# Patient Record
Sex: FEMALE | Race: WHITE
[De-identification: names, ages, dates, MRNs, and addresses within clinical notes are randomized per-mention and may not be internally consistent; named-entity substitution may affect disease eponyms.]

---

## 2017-06-10 NOTE — RAD
HISTORY: Pain after fall



COMPARISONS: None



VIEWS: 8, Frontal, lateral, open-mouth odontoid, and bilateral oblique views of the

cervical spine.



FINDINGS: 

The cervical spine is visualized from the skull base through T1.



ALIGNMENT:  There is straightening of the normal cervical lordosis.

VERTEBRAL BODIES:  There is diffuse osteopenia. Is multilevel anterolateral marginal

osteophyte formation most pronounced from C3-C4 inferiorly through C6-C7. There is

suboptimal visualization of the odontoid process, though this appears intact. There is no

appreciable displaced fracture.

JOINTS:  There is diffuse, extensive uncovertebral and facet osteoarthritic change. There

is moderate diffuse neural foraminal narrowing on the oblique views bilaterally.    

INTERVERTEBRAL DISCS:  There is diffuse loss of intervertebral disc height.

SOFT TISSUE: The prevertebral soft tissues are normal.



OTHER:  The skull base is normal. The lung apices are clear.



IMPRESSION: 

OSTEOPENIA.

EXTENSIVE DISC DISEASE AND OSTEOARTHRITIS.

NO DEFINITE ACUTE OSSEOUS INJURY. IF SYMPTOMS PERSIST, CONSIDER REPEAT IMAGING.

## 2017-06-10 NOTE — RAD
HISTORY: Pain after fall



COMPARISONS: None



VIEWS: 5, frontal, axial, and bilateral oblique views of the mandible    



FINDINGS:



BONE DENSITY: Normal.

BONES: There is no displaced fracture.    

JOINTS: There is no arthropathy.    

ALIGNMENT: There is no dislocation. 

SOFT TISSUES: Unremarkable.



OTHER FINDINGS: None.



IMPRESSION: 

NO ACUTE OSSEOUS INJURY. IF SYMPTOMS PERSIST, RECOMMEND REPEAT IMAGING.

## 2017-06-10 NOTE — UC
Minor Trauma HPI





- HPI Summary


HPI Summary: 


2 days ago fell backward off a 6ft ledge in her yard-she was able to get her 

self up and continue to work---yesterday she was a bit sore and today the 

soreness in her back neck and jaw is worse








- History of Current Complaint


Chief Complaint: UCTrauma


Stated Complaint: JAW INJURY


Time Seen by Provider: 06/10/17 09:31


Hx Obtained From: Patient


Pregnant?: No


Onset/Duration: Sudden Onset, Lasting Days - 2, Worse Since - this morning


Onset Of Pain: Post Accident - and worse 2 days later


Severity Initially: Mild


Severity Currently: Moderate


Pain Intensity: 6


Pain Scale Used: 0-10 Numeric


Mechanism Of Injury: Fall From Height Of: - 6


Aggravating Factor(s): Nothing


Alleviating Factor(s): Nothing





- Allergies/Home Medications


Allergies/Adverse Reactions: 


 Allergies











Allergy/AdvReac Type Severity Reaction Status Date / Time


 


No Known Allergies Allergy   Verified 06/10/17 09:28














PMH/Surg Hx/FS Hx/Imm Hx


Previously Healthy: Yes





- Surgical History


Surgical History: Yes


Surgery Procedure, Year, and Place: LT ANKLE ACHILLES TENDON REPLACED 2003, RT 

KNEE ARTHROSCOPIC, RT WRIST, RT ELBOW ,TONSILS AGE 8, WISDOM TEETH REMOVED,1997 

LEFT BREAST BIOPSY





- Family History


Known Family History: Positive: None





- Social History


Occupation: Retired


Lives: With Family


Alcohol Use: Rare


Substance Use Type: None


Smoking Status (MU): Never Smoked Tobacco





Review of Systems


Constitutional: Negative


Skin: Negative


Eyes: Negative


ENT: Negative


Respiratory: Negative


Cardiovascular: Negative


Gastrointestinal: Negative


Genitourinary: Negative


Motor: Negative


Neurovascular: Negative


Musculoskeletal: Negative, Myalgia - neck and and upper back


Neurological: Negative


Psychological: Negative


All Other Systems Reviewed And Are Negative: Yes





Physical Exam


Triage Information Reviewed: Yes


Appearance: Well-Appearing, No Pain Distress, Well-Nourished


Vital Signs: 


 Initial Vital Signs











Temp  100.0 F   06/10/17 09:24


 


Pulse  75   06/10/17 09:24


 


Resp  18   06/10/17 09:24


 


BP  152/75   06/10/17 09:24


 


Pulse Ox  98   06/10/17 09:24











Vital Signs Reviewed: Yes


Eye Exam: Normal


Eyes: Positive: Conjunctiva Clear


ENT Exam: Normal


ENT: Positive: Normal ENT inspection, Hearing grossly normal, TMs normal.  

Negative: Nasal congestion, Nasal drainage, Tonsillar swelling, Tonsillar 

exudate, Trismus, Muffled/hoarse voice


Dental Exam: Normal


Neck exam: Normal


Neck: Positive: Supple, Nontender, No Lymphadenopathy


Respiratory Exam: Normal


Respiratory: Positive: Chest non-tender, Lungs clear, Normal breath sounds, No 

respiratory distress, No accessory muscle use


Cardiovascular Exam: Normal


Cardiovascular: Positive: RRR, No Murmur, Pulses Normal, Brisk Capillary Refill


Musculoskeletal Exam: Normal


Musculoskeletal: Positive: Strength Intact, ROM Intact, No Edema


Neurological Exam: Normal


Neurological: Positive: Alert, Muscle Tone Normal


Psychological Exam: Normal


Skin Exam: Normal





Diagnostics





- Laboratory


Diagnostic Studies Completed/Ordered: osteopenia, no fx,





Minor Trauma Course/Dx





- Course


Course Of Treatment: ibuprofen, soma, ice, mayank exercise, follow with pcp





- Differential Dx/Diagnosis


Differential Diagnosis/HQI/PQRI: Contusion(s), Dislocation, Sprain, Strain


Provider Diagnoses: COntusions, muscle strain





Discharge





- Discharge Plan


Condition: Stable


Disposition: HOME


Prescriptions: 


Carisoprodol TAB* [Soma  TAB*] 175 - 350 mg PO TID PRN #10 tab MDD 3


 PRN Reason: pain


Ibuprofen TAB* [Motrin TAB* 600 MG] 600 mg PO Q6H PRN #30 tab


 PRN Reason: pain


Patient Education Materials:  Cervical Strain (ED), Muscle Strain (ED), Core 

Strengthening Exercises (GEN)


Referrals: 


Ernestine Espinoza MD [Primary Care Provider] - If Needed

## 2017-06-10 NOTE — RAD
HISTORY: Pain after fall



COMPARISONS: None



VIEWS: 2, Frontal and lateral views of the thoracic spine.



FINDINGS:



ALIGNMENT:  There is a scoliotic curvature of the spine

VERTEBRAL BODIES:  There is diffuse osteopenia. There is multilevel anterolateral marginal

osteophyte formation. The vertebral bodies are preserved in height.

JOINTS:  There is osteoarthritis of the costovertebral articulations

INTERVERTEBRAL DISCS:  There is diffuse loss of intervertebral disc height.

SOFT TISSUE: Unremarkable



OTHER:  The visualized lungs are clear.



IMPRESSION: 

OSTEOPENIA.

DEGENERATIVE DISC DISEASE AND OSTEOARTHRITIS.

## 2017-12-04 NOTE — HP
HISTORY AND PHYSICAL:

 

DATE OF OFFICE VISIT:  12/04/17

 

DATE OF SURGERY:  12/15/17

 

SURGEON:  Nirmala Baldwin MD * (DICTATED BY LLOYD REHMAN)

 

PROCEDURE:  Left total knee arthroplasty.

 

CHIEF COMPLAINT:  Left knee pain.

 

HISTORY OF PRESENT ILLNESS:  Ms. Grant is a 72-year-old female with complaints 
of left knee pain secondary to end-stage osteoarthritis.  She has failed 
conservative management and elected to proceed with a left total knee 
arthroplasty, which is scheduled for 12/15/17 with Dr. Baldwin.

 

PAST MEDICAL HISTORY:  Hypertension.

 

PAST SURGICAL HISTORY:  Tonsillectomy, left knee arthroscopy, right elbow 
tendon release, left breast biopsy, de Quervain's release, removal of a skin 
keratosis, and Achilles tendon transfer.

 

CURRENT MEDICATIONS:  None.

 

ALLERGIES:  PENICILLIN and BAND-AIDS.

 

FAMILY HISTORY:  Diabetes and heart disease.

 

SOCIAL HISTORY:  She is a 72-year-old female.  She lives with her sister.  She 
does not smoke or use drugs.  Uses occasional alcohol.

 

REVIEW OF SYSTEMS:  A complete 14-point review of systems was reviewed with the 
patient, it was all negative and noncontributory.

 

                               PHYSICAL EXAMINATION

 

GENERAL:  She is well developed, well nourished, in no acute distress.

 

VITAL SIGNS:  She stands 5 feet tall, weighs 153 pounds.  Her blood pressure is 
157/82, her heart rate is 84.

 

HEENT:  Normocephalic, atraumatic.

 

NECK:  Supple.  No palpable lymph nodes.

 

PULMONARY:  The lungs are clear to auscultation bilaterally.

 

CARDIO:  Regular rate and rhythm.  Strong S1, S2.

 

ABDOMEN:  Soft, nontender, and nondistended.

 

MUSCULOSKELETAL:  Left lower extremity, the skin is intact.  There are no open 
wounds or abrasions.  There is a moderate joint effusion and tenderness over 
the medial and lateral joint line.  5 to 125 degrees of range of motion with 
patellofemoral crepitus.  2+ dorsalis pedis pulses.  Her lower extremity muscle 
group strengths were intact at 5/5 and she has intact sensation.

 

NEUROLOGICAL:  She is alert and oriented x3.  Cranial nerves II through XII are 
intact.

 

 ASSESSMENT AND PLAN:  Ms. Grant is a 72-year-old female with continued 
complaints of left knee pain secondary to end-stage osteoarthritis.  She has 
failed conservative management and elected to proceed with a left total knee 
arthroplasty which is scheduled for 12/15/17 with Dr. Baldwin.  Dr. Baldwin 
discussed the risks and benefits of the surgery at today's visit and all of her 
questions were answered. Percocet and Colace were sent to her pharmacy for 
postoperative pain control.  She has elected aspirin twice daily for DVT 
prophylaxis following the surgery and she will see Dr. Baldwin back in 2 weeks 
after the surgery.

 

 ____________________________________ LLOYD REHMAN

 

[id number]/CPS #: [id number]

MTDD

## 2017-12-15 NOTE — RAD
INDICATION: Left knee surgery     



COMPARISON: October 16, 2017



TECHNIQUE: A portable 2 view examination was performed.



FINDINGS: There is left knee arthroplasty. Both femoral and tibial components appear well

seated. There is no overlying cooling jacket. A drain is in place.



IMPRESSION: POSTOPERATIVE LEFT TOTAL KNEE

## 2017-12-16 NOTE — OP
OPERATIVE REPORT:

 

DATE OF OPERATION:  12/15/17

 

DATE OF BIRTH:  [birth year]

 

ATTENDING SURGEON:  Kathi Baldwin MD

 

ASSISTANT:  LLOYD Trujillo

 

Mr. Miramontes did help throughout the procedure with preparation of the leg, wound retraction, manipulat
ion of the knee, and wound closure.

 

ANESTHESIOLOGIST:  Dr. Javier.

 

ANESTHESIA:  Spinal.

 

PRE-OP DIAGNOSIS:  Severe end-stage degenerative osteoarthritis of the left knee joint.

 

POST-OP DIAGNOSIS:  Severe end-stage degenerative osteoarthritis of the left knee joint.

 

OPERATIVE PROCEDURE:  Left total knee arthroplasty.

 

TOURNIQUET TIME:  43 minutes.

 

ESTIMATED BLOOD LOSS:  300 cc.

 

COMPLICATIONS:  None.

 

SPECIMENS:  Bone and cartilage from the left knee joint sent to Pathology.

 

HARDWARE:  All hardware was cemented [employer] total knee hardware.  Two packages of Simplex b
one cement.  For the femur, a size 4 left posterior stabilized Legion femoral component.  For the tib
ia, a size 3 left Marley II tibial base plate.  For the insert, a 9 mm posterior stabilized articula
r insert size 3-4, and for the patellar, a 32-mm 3-peg all poly patella thickness 7.5.

 

BRIEF HISTORY/INDICATIONS:  Ms. Grant is a 72-year-old female with years of increasingly severe left
 knee pain.  Radiographs showed bone on bone arthritis. She failed conservative treatment with antiin
flammatories, pain medications, physical therapy, and intraarticular injections.  Due to continued pa
in and decreased quality of life, she elected to undergo a left total knee arthroplasty. Informed con
sent was obtained from the patient.  She understood the risks of surgery included but were not limite
d to bleeding, infection, damage to nearby structures, continued pain, need for further surgery, intr
aoperative fracture, nerve palsy, hardware failure or loosening, knee stiffness, loss of motion, stro
ke, heart attack, blood clot, and death.  She wished to proceed.

 

INTRAOPERATIVE FINDINGS:  Intraoperatively, the patient is noted to have severe end- stage arthritis 
in a tricompartmental fashion.  There was complete loss of cartilage in all 3 compartments.

 

DESCRIPTION OF PROCEDURE:  Ms. Grant was identified in the preanesthesia unit. Her left lower extrem
ity was marked as the correct operative site.  Informed consent was signed and placed in the chart.  
The patient was taken to the operating room and placed under spinal anesthesia.  A Mariano catheter was
 placed.  Tourniquet was placed on the left thigh.  Left lower extremity was prepped and draped in th
e usual sterile fashion.  Preop time-out was made to correctly identify the patient side and site.  A
ppropriate perioperative antibiotics were given within 1 hour of incision.

 

Tourniquet was inflated and a midline incision of 12 cm was made with a 10-blade. A new 10-blade was 
used to make a standard medial parapatellar arthrotomy.  The patella was subluxed laterally.  Electro
cautery was used to subperiosteally elevate soft tissue off the superomedial tibia to the mid sagitta
l plane.  The knee was flexed up.  The anterior horn of the lateral meniscus and ACL were sharply rel
eased.  A drill was used to enter the distal femur.  Intramedullary distal femoral cutting guide was 
pinned on the distal femur.  Oscillating saw was used to make the appropriate distal femoral cut.  Ex
ternal rotation guide was pinned on the distal femur.  Distal femur was sized to a size 4.  Size 4 mu
lti cutting jig was pinned on the distal femur.  Oscillating saw was used to make the appropriate 4 c
hamfer cuts.

 

The PCL was completely released.  The tibia was subluxed anteriorly. Extramedullary tibial cutting gu
kaden was pinned on the proximal tibia.  Oscillating saw was used to make a proximal tibial cut perpend
icular to the mechanical axis of the tibia.  The bone was carefully removed.  The knee was brought ou
t into full extension.  Medial and lateral ligaments were well balanced.  The spacer block  had good 
fit.  Flexion and extension gaps were well balanced.

 

The knee was flexed up.  Lamina  was placed both medially and laterally. Any remaining menisc
us was carefully removed using electrocautery.  Curved osteotome was used to remove any posterior ost
eophytes.  Tibial tray and drop rods were placed to once again confirm a satisfactory tibial cut.  Th
is was confirmed.

 

A size 4 left femoral trial was impacted on to the distal femur.  Box cut osteotome and reamer were u
sed to carefully remove bone for the posterior stabilized implant. A size 3 tibial tray and a 9-mm in
sert trial were placed.  The knee was taken through a range of motion.  The knee had full extension t
o 130 degrees of flexion with satisfactory patellofemoral tracking.

 

The patella was everted.  7 mm of patellar bone and cartilage was carefully removed from the patella.
  The three peg holes were drilled through the size 32 guide. Trial 32 patellar was placed and the kn
ee was taken through a range of motion. Patellofemoral tracking was satisfactory.

 

All trials were carefully removed.  The tibia was subluxed anteriorly and sized to a size 3.  Proxima
l tibia was prepared using a size 3 keel punch.  All bony cut surfaces were copiously irrigated with 
sterile saline and dried.  Final implants were cemented into place starting with the tibia followed b
y the femur and last the patella.  A 9-mm insert trial was placed and the knee was taken out into ful
l extension.  Cement was allowed to fully cure and tourniquet was turned down at 43 minutes.  The kne
e was copiously irrigated with sterile saline.  Electrocautery was used to obtain meticulous hemostas
is.

 

The cement was allowed to fully cured.  The insert trial was removed.  Any cement around the implants
 or capsule was removed.  A 9-mm posterior stabilized articular insert size 3-4 was locked into posit
ion on the tibial tray.  The stability was checked and rechecked and noted to be stable.

 

The knee was once again copiously irrigated with sterile saline.  The extensor mechanism was closed u
sing interrupted #1 Vicryl over a medium Hemovac drain.  The rest of the incision was closed in a lay
ered fashion using 0 and 2-0 Vicryls.  The skin was closed using running 3-0 nylon suture.  Sterile X
eroform, 4x4s, and Webril were used to cover the incision.  Ace wrap and cold pack were placed over t
his. The patient's anesthesia was reversed without difficulty.  She was taken to the PACU in stable c
ondition.  Intended weight bearing will be weight bearing as tolerated.  Intended DVT prophylaxis kimberley
l be Coumadin with a Lovenox bridge.

 

 [id number]/CPS #: [id number]

## 2017-12-16 NOTE — PN
Progress Note





- Progress Note


Date of Service: 12/16/17


SOAP: 


Subjective:


Pt. is alert, reports severe pain.  








Objective:


LLE - drain removed, tip intact, distally nvi.  





Vital Signs:











Temp Pulse Resp BP Pulse Ox


 


 97.4 F   79   18   132/73   100 


 


 12/16/17 03:51  12/16/17 03:51  12/16/17 08:45  12/16/17 03:51  12/16/17 03:51








 Laboratory Results - last 24 hr











  12/16/17 12/16/17 12/16/17





  05:17 05:17 05:17


 


Hgb  10.6 L  


 


Hct  31 L  


 


Plt Count  268  


 


MPV  8  


 


INR (Anticoag Therapy)   1.22 H 


 


Sodium    130 L


 


Potassium    3.9


 


Chloride    97 L


 


Carbon Dioxide    28


 


Anion Gap    5


 


BUN    11


 


Creatinine    0.71


 


Est GFR ( Amer)    104.7


 


Est GFR (Non-Af Amer)    81.4


 


BUN/Creatinine Ratio    15.5


 


Glucose    111 H


 


Calcium    8.4 L

















Assessment:


73 yo F pod 1 s/p LTKA








Plan:


ER morphine 15 mg bid added for pain control


wbat 


pt/ot


6 mg coumadin tonight, lovenox today


plan d/c to home monday with snf

## 2017-12-17 NOTE — PN
Progress Note





- Progress Note


Date of Service: 12/17/17


SOAP: 


Subjective:


Pt. is alert, pain controlled, wants to go home today.  








Objective:


LLE - dressing changed, inc c/d/i.  distally nvi.  





Vital Signs:











Temp Pulse Resp BP Pulse Ox


 


 97.2 F   60   18   127/67   96 


 


 12/17/17 04:11  12/17/17 04:11  12/17/17 07:07  12/17/17 04:11  12/17/17 07:07








 Laboratory Results - last 24 hr











  12/17/17 12/17/17





  06:05 06:05


 


Hgb  9.9 L 


 


Hct  29 L 


 


Plt Count  220 


 


MPV  8 


 


INR (Anticoag Therapy)   1.32 H

















Assessment:


71 yo F pod 2 s/p LTKA








Plan:


wbat lle 


pt/ot


home on ecasa bid 325


lovenox today before d/c


plan d/c today with vns

## 2018-07-24 ENCOUNTER — HOSPITAL ENCOUNTER (INPATIENT)
Dept: HOSPITAL 25 - AA | Age: 73
LOS: 107 days | Discharge: HOME | DRG: 470 | End: 2018-11-08
Attending: ORTHOPAEDIC SURGERY | Admitting: ORTHOPAEDIC SURGERY
Payer: MEDICARE

## 2018-07-24 DIAGNOSIS — Z88.6: ICD-10-CM

## 2018-07-24 DIAGNOSIS — Z96.652: ICD-10-CM

## 2018-07-24 DIAGNOSIS — M17.11: Primary | ICD-10-CM

## 2018-07-24 DIAGNOSIS — Z80.3: ICD-10-CM

## 2018-07-24 DIAGNOSIS — M41.9: ICD-10-CM

## 2018-07-24 DIAGNOSIS — Z84.1: ICD-10-CM

## 2018-07-24 DIAGNOSIS — Z82.49: ICD-10-CM

## 2018-07-24 DIAGNOSIS — M25.461: ICD-10-CM

## 2018-07-24 DIAGNOSIS — Z82.0: ICD-10-CM

## 2018-07-24 DIAGNOSIS — Z72.89: ICD-10-CM

## 2018-07-24 DIAGNOSIS — M25.761: ICD-10-CM

## 2018-07-24 DIAGNOSIS — L71.9: ICD-10-CM

## 2018-07-24 DIAGNOSIS — Z88.1: ICD-10-CM

## 2018-07-24 DIAGNOSIS — M47.812: ICD-10-CM

## 2018-07-24 DIAGNOSIS — E66.3: ICD-10-CM

## 2018-07-24 DIAGNOSIS — Z81.1: ICD-10-CM

## 2018-07-24 DIAGNOSIS — I10: ICD-10-CM

## 2018-07-24 DIAGNOSIS — Z82.61: ICD-10-CM

## 2018-07-24 DIAGNOSIS — Z86.12: ICD-10-CM

## 2018-07-24 DIAGNOSIS — Z88.0: ICD-10-CM

## 2018-07-24 PROCEDURE — 85018 HEMOGLOBIN: CPT

## 2018-07-24 PROCEDURE — C1776 JOINT DEVICE (IMPLANTABLE): HCPCS

## 2018-07-24 PROCEDURE — 85014 HEMATOCRIT: CPT

## 2018-07-24 PROCEDURE — 80048 BASIC METABOLIC PNL TOTAL CA: CPT

## 2018-07-24 PROCEDURE — 36415 COLL VENOUS BLD VENIPUNCTURE: CPT

## 2018-07-24 PROCEDURE — 85610 PROTHROMBIN TIME: CPT

## 2018-07-24 PROCEDURE — 85049 AUTOMATED PLATELET COUNT: CPT

## 2018-10-24 NOTE — HP
*** AMENDED REPORT NOW INCLUDES DESIGNATED COSIGNER ***



HISTORY AND PHYSICAL:

 

DATE OF ADMISSION/SURGERY:  11/06/18

 

DATE OF OFFICE VISIT:  10/24/18

 

SURGEON:  Kathi Baldwin MD * (DICTATED YBLLOYD BURGOS)

 

PROCEDURE:  Right total knee arthroplasty.

 

CHIEF COMPLAINT:  Right knee pain.

 

HISTORY OF PRESENT ILLNESS:  Ms. Grant is a 73-year-old female with end-stage 
osteoarthritis of the right knee.  She has failed conservative treatment and 
elected to proceed with a right total knee arthroplasty, which is scheduled for 
11/06/18.

 

PAST MEDICAL HISTORY:  Hypertension.

 

PAST SURGICAL HISTORY:  Tonsillectomy, right knee arthroscopy, right elbow 
surgery, breast biopsy, right wrist de Quervain's release, Achilles tendon 
transfer left lower extremity and a left total knee arthroplasty.

 

CURRENT MEDICATIONS:  Losartan potassium 25 mg daily.

 

ALLERGIES:  To CLINDAMYCIN causing a rash, MELOXICAM, and PENICILLIN.

 

FAMILY HISTORY:  Dementia.

 

SOCIAL HISTORY:  She is a 73-year-old female.  She lives with her sister.  She 
does not smoke or use drugs.  Uses occasional alcohol.

 

REVIEW OF SYSTEMS:  A complete 14-point review of systems was reviewed with the 
patient.  It was all negative or noncontributory.

 

                               PHYSICAL EXAMINATION

 

GENERAL:  She is well developed, well nourished, in no acute distress.

 

VITAL SIGNS:  She stands 5 feet tall, weighs 160 pounds.  Her blood pressure is 
140/88 and her heart rate is 96.

 

HEENT:  Normocephalic, atraumatic.

 

NECK:  Supple.  No palpable lymph nodes.

 

PULMONARY:  The lungs are clear to auscultation bilaterally.

 

CARDIO:  Regular rate and rhythm.  Strong S1, S2.

 

ABDOMEN:  Soft, nontender, nondistended.

 

MUSCULOSKELETAL:  Right lower extremity:  The skin is intact.  There are no 
open wounds or abrasions.  She has a moderate joint effusion, some tenderness 
over the medial and lateral joint line.  Range of motion is 5 to 130 degrees.  
She is distally neurovascularly intact.

 

 ASSESSMENT AND PLAN:  Ms. Grant is a 73-year-old female with end-stage 
osteoarthritis of the right knee.  She has failed conservative treatment and 
elected to proceed with a right total knee arthroplasty, which is scheduled for 
11/06/18 with Dr. Baldwin.  Dr. Baldwin discussed the risks and benefits of the 
surgery at today's visit and all of her questions were answered.  She will 
follow up with Dr. Baldwin 2 weeks after the surgery.

 

 ____________________________________ LLOYD REHMAN

 

[id number]/CPS #: [id number]

MTDSALLY

## 2018-11-06 PROCEDURE — 0SRC0J9 REPLACEMENT OF RIGHT KNEE JOINT WITH SYNTHETIC SUBSTITUTE, CEMENTED, OPEN APPROACH: ICD-10-PCS | Performed by: ORTHOPAEDIC SURGERY

## 2018-11-06 RX ADMIN — MAGNESIUM HYDROXIDE SCH ML: 400 SUSPENSION ORAL at 21:06

## 2018-11-06 RX ADMIN — OXYCODONE HYDROCHLORIDE PRN MG: 5 CAPSULE ORAL at 21:18

## 2018-11-06 RX ADMIN — MORPHINE SULFATE PRN MG: 4 INJECTION INTRAVENOUS at 22:34

## 2018-11-06 RX ADMIN — MAGNESIUM OXIDE TAB 400 MG (241.3 MG ELEMENTAL MG) SCH MG: 400 (241.3 MG) TAB at 21:05

## 2018-11-06 RX ADMIN — ACETAMINOPHEN SCH: 325 TABLET ORAL at 22:44

## 2018-11-06 RX ADMIN — DOCUSATE SODIUM SCH MG: 100 CAPSULE, LIQUID FILLED ORAL at 21:05

## 2018-11-06 NOTE — CONS
CC:  Dr. Ernestine Espinoza; Dr. Kathi Baldwin *

 

[facility] MEDICINE CONSULTATION REPORT:

 

DATE OF CONSULT:  18

 

PRIMARY CARE PROVIDER:  Dr. Ernestine Espinoza.

 

REQUESTING PHYSICIAN IN CONSULT:  Dr. Kathi Baldwin.

 

ATTENDING PHYSICIAN:  Dr. Virginia More.

 

REASON FOR CONSULT:  Hypertension.

 

HISTORY OF PRESENT ILLNESS:  Ms. Grant is a 73-year-old female with history of 
hypertension and right knee osteoarthritis, who presented to [facility] on 18 
for an elective total right knee arthroplasty.  I will refer you to LLOYD Rosenberg's history and physical dictated on 10/24/18 for complete details.  
Ms. Grant reports that she has typically been active, though recently has had 
difficulty with activity because of her right knee pain.  She reports that the 
pain is essentially nonexistent when she is not bending her knee, but she has 
significant pain with bending; hence, she has not been able to perform 
difficult activities like walking long distances, which she once was though she 
does try to stay active.  Ms. Grant reports that she only developed 
hypertension approximately 1 year ago after her last surgery, she reports that 
her blood pressure started to increase slowly and she was subsequently placed 
on losartan by her primary care provider.  She has done well on the losartan, 
has had no side effects, and appears to be well managed at baseline according 
to the patient and office notes from her PCP.

 

PAST MEDICAL HISTORY:  

1.  Hypertension.

2.  Osteoarthritis.

 

PAST SURGICAL HISTORY:

1.  Tonsillectomy.

2.  Right knee arthroscopy.

3.  Right elbow surgery.

4.  Right wrist de Quervain's release.

5.  Achilles tendon transfer on the left lower extremity.

6.  Left total knee arthroplasty.

 

HOME MEDICATIONS:

1.  Aspirin 325 mg p.o. b.i.d. p.r.n.

2.  AlgaeCal supplement 2 caps p.o. daily.

3.  Calcium, magnesium, zinc 1 tab p.o. daily.

4.  Vitamin D3 2000 units p.o. daily.

5.  Kelp 100 mg p.o. daily.

6.  Losartan 25 mg p.o. daily.

7.  Magnesium oxide 800 mg p.o. b.i.d.

8.  Vitamin K2 100 mcg p.o. daily.

9.  Milk Thistle 300 mg p.o. daily.

10.  Women's Multivitamin 1 tab p.o. daily.

11.  Psyllium 0.4 g p.o. daily p.r.n.

12.  Selenium 400 mcg p.o. daily.

13.  CoQ10 30 mg p.o. daily.

14.  Zinc 15 mg p.o. daily.

 

ALLERGIES:  PENICILLIN, CLINDAMYCIN, and MELOXICAM.

 

FAMILY HISTORY:  She reports her father  at the age of 97, he had CHF and 
kidney disease.  Her mother passed away in her 70s, she had CHF and alcohol 
abuse. No family history of cancer, diabetes, or coronary artery disease.

 

SOCIAL HISTORY:  She denies any tobacco or recreational drug use.  She reports 
approximately 1 alcoholic drink a day, usually wine with dinner.  She lives 
with her sister, who would be her surrogate decision maker.

 

REVIEW OF SYSTEMS:  An 11-point review of systems was performed and all 
pertinent positive and negative findings are in the HPI, all other systems are 
negative.

 

PHYSICAL EXAM:  General:  Ms. Grant is a well-developed, well-nourished 
overweight white woman, lying in bed, in no acute distress.  She appears her 
stated age. Vital Signs:  Temp 97.9, heart rate 71, respiratory rate 16, oxygen 
saturation 97% on 2 L, blood pressure 128/69.  HEENT:  Head is atraumatic, 
normocephalic.  Visual fields are grossly intact.  Pupils equal, round, and 
reactive to light and accommodation.  Mucous membranes are moist.  Neck:  
Thyroid not palpable.  Trachea midline.  No lymphadenopathy.  Respiratory:  
Symmetrical chest expansion.  No chest wall deformities.  Lungs:  Clear to 
auscultation throughout.  No rhonchi, wheezes, or rales.  Cardiovascular:  
Regular rate and rhythm.  S1, S2 present.  No murmurs, rubs, or gallops.  No 
JVD.  Extremities:  Skin warm and smooth bilaterally.  No edema.  Pedal pulses 2
+ bilaterally.  Abdomen:  Soft, nontender to palpation. Bowel sounds 
normoactive throughout.  Neuro:  Awake, alert, and oriented x4. Cranial nerves 
II through XII grossly intact.

 

DIAGNOSTIC STUDIES/LAB DATA:  She had most recent labs on 10/24/18.  WBC 6.6, 
RBC 4.65, hemoglobin 14.8, hematocrit 43, platelets 254.  INR 0.92.  Sodium 141
, potassium 4.2, chloride 102, carbon dioxide 31, BUN 21, creatinine 0.85, 
glucose 95.  TSH 2.72.  Urinalysis unremarkable.

 

She had a chest x-ray on 10/24/18, which showed hyperinflation consistent with 
COPD.  There is no active cardiopulmonary disease.

 

ASSESSMENT AND PLAN:  Ms. Grant is a 73-year-old female with past medical 
history of hypertension, who presented to [facility] today for an elective right total 
knee arthroplasty.  The patient will be admitted under the orthopedic service 
for:

 

1.  Right knee total arthroplasty.  Management per Ortho.

2.  Hypertension.  The patient is currently normotensive in the PACU.  It 
sounds as though she has done well on her losartan for the past year.  I would 
recommend holding losartan for 2 days postoperatively to avoid acute kidney 
injury or hypotension and it can then be resumed as long as she is not 
hypotensive.

3.  Code status.  The patient is a full code.

4.  DVT prophylaxis per Ortho.



Thank you for this consultation. We will sign off at this time, but please do 
not hesitate to call with any questions.

 

TIME SPENT:  Approximately 30 minutes was spent on this consultation, greater 
than half of that time spent with the patient obtaining my history and 
performing my physical exam and reviewing the plan of care.



This case has been reviewed with my attending, Dr. More, who is in agreement 
with the plan of care.

 

____________________________________ PREM HERNANDEZ NP

 

[id number]/CPS #: [id number]

LISA

## 2018-11-06 NOTE — XMS REPORT
Monserrat Conteh

 Created on:2018



Patient:Monserrat Conteh

Sex:Female

:1945

External Reference #:[id number]





Demographics







 Address  [address]



   [place] [address]

 

 Mobile Phone  [phone]

 

 Email Address  [email or web address]

 

 Preferred Language  English

 

 Marital Status  Not  Or 

 

 Congregation Affiliation  Unknown

 

 Race  White

 

 Ethnic Group  Not  Or 









Author







 Organization  [employer]

 

 Address  [address]



   [place] [address]

 

 Phone  [phone]









Support







 Name  Relationship  Address  Phone

 

 Shell Conteh  Unavailable  [address]  [phone]



     [place] [address]  









Care Team Providers







 Name  Role  Phone

 

 Ernestine Espinoza MD  Primary Care Physician  Unavailable









Payers







 Type  Date  Identification Numbers  Payment Provider  Subscriber

 

 Medicare Primary  Effective:  Policy Number:  Medicare  Monserrat Conteh



   2012  [id number]    









 PayID: [id number]  [address]









 [place] [address]









 Medigap Part B    Policy Number: [id number]  [employer]  Monserrat Conteh









 Group Number: [id number]  [address]

 

 PayID: [id number]  [place] [address]







Problems







 Date  Description  Provider  Status

 

 Onset: 10/16/2017  Localized, primary osteoarthritis  Kathi Baldwin M.D.  
Active

 

 Onset: 2018  Arthroplasty of knee  Kathi Baldwin M.D.  Active

 

 Onset: 2018  Sprain of medial collateral ligament of  Kathi Baldwin M.D.
  Active



   knee    







Family History







 Date  Family Member(s)  Problem(s)  Comments

 

   General  No Current Problems  







Social History







 Type  Date  Description  Comments

 

 Lives With    siblings  

 

 Occupation    Retired  

 

 Smoking    Patient has never smoked  







Allergies, Adverse Reactions, Alerts







 Date  Description  Reaction  Status  Severity  Comments

 

 10/16/2017  Penicillin    active    

 

 10/16/2017  Bandaids    active    

 

 2018  Clindamycin    active  Moderate  red, itchy rash

 

 2018  Meloxicam    active    







Medications







 Medication  Date  Status  Form  Strength  Qnty  SIG  Indications  Ordering



                 Provider

 

 Losartan  00/00/  Active  Tablets  25mg    Take 1    Unknown



 Potassium  0000          Tablet    



             By Mouth    



             Every    



             Day    

 

                 

 

 Ibuprofen 200  05/10/  Hx  Tablets  200mg  120tab  400mg    [place]



   2018 -        s  every 6    [place],



   /          hours as    M.D.



   2018          needed    



             for    



             pain.    

 

 Meloxicam  /  Hx  Tablets  15mg  30tabs  1 by  Z47.1  Kathi



    -          mouth    Denny,



   /          every    M.D.



   2018          day    

 

 Gabapentin  /  Hx  Capsules  300mg  30caps  1 tab by  M25.562  Kathi



    -          mouth    Denny,



   /          before    M.D.



   2018          bed    

 

 Ibuprofen  /  Hx  Tablets  600mg  90tabs  take 1    Kathi



    -          tab by    Denny,



   /          mouth    M.D.



             with    



             food 3    



             times a    



             day as    



             needed    



             pain    

 

 Cyclobenzaprine  /  Hx  Tablets  10mg  90tabs  take 1    Kathi



 HCL   -          tab by    Denny,



   /          mouth    M.D.



   2018          2-3    



             times a    



             day as    



             needed    

 

 Norco  /  Hx  Tablets  5-325mg  60tabs  1 by    Kathi



    -          mouth    Denny,



   /          every 4    M.D.



   2018          to 6    



             hours as    



             needed    

 

 MS Contin  /  Hx  Tablets ER  15mg  14tabs  1 by    Kathi



    -          mouth    Denny,



   /          twice a    M.D.



   2018          day    

 

 Percocet  /  Hx  Tablets  5-325mg  90tabs  1-2 by    Kathi



    -          mouth    Denny,



   /          every    M.D.



   2017          4-6    



             hours as    



             needed    



             pain    

 

 Colace  /  Hx  Capsules  100mg  90caps  1 tab by    Kathi



    -          mouth    Denny,



   /          2-3    M.D.



   2018          times a    



             day as    



             needed    

 

 No Active  11/15/  Hx            Unknown



 Medications   -              



   2017              

 

 Compression  10/16/  Hx  Misc    1units   -  M79.605  Kathi



 Stockings   -          lle    Denny,



   /          swelling    M.D.



             /edema    



             s/p L    



             swelling    



             -  need    



             thigh    



             high    

 

 No Active  10/16/  Hx            Unknown



 Medications   -              



   2017              

 

 Aspirin  /  Hx            Unknown



    -              



   2017              

 

 Advil  /  Hx            Unknown



    -              



   2017              

 

 Aspirin  /  Hx            Unknown



   0000 -              



   2018              

 

 Cozaar  /  Hx            Unknown



   0000 -              



   2018              

 

 Norco  /  Hx            Unknown



    -              



   2018              

 

 Lisinopril  /  Hx            Unknown



    -              



   10/23/              



   2018              







Vital Signs







 Date  Vital  Result  Comment

 

 10/24/2018  Height  60 inches  5'0"









 Weight  161.00 lb  

 

 Heart Rate  96 /min  

 

 BP Systolic  140 mmHg  

 

 BP Diastolic  88 mmHg  

 

 BMI (Body Mass Index)  31.4 kg/m2  









 2018  Height  60 inches  5'0"









 Weight  152.00 lb  

 

 Heart Rate  68 /min  

 

 BP Systolic  176 mmHg  

 

 BP Diastolic  90 mmHg  

 

 BMI (Body Mass Index)  29.7 kg/m2  









 2018  Height  61 inches  5'1"









 Weight  149.00 lb  

 

 Heart Rate  80 /min  

 

 BP Systolic  149 mmHg  

 

 BP Diastolic  90 mmHg  

 

 BMI (Body Mass Index)  28.2 kg/m2  









 2018  Height  60 inches  5'0"









 Weight  149.00 lb  

 

 BP Systolic  162 mmHg  

 

 BP Diastolic  98 mmHg  

 

 Body Temperature  98.5 F  

 

 BMI (Body Mass Index)  29.1 kg/m2  









 2018  Height  60 inches  5'0"









 Weight  155.00 lb  

 

 BP Systolic  158 mmHg  

 

 BP Diastolic  98 mmHg  

 

 Body Temperature  98.0 F  

 

 BMI (Body Mass Index)  30.3 kg/m2  









 2018  Height  60 inches  5'0"









 Weight  152.00 lb  

 

 BP Systolic  132 mmHg  

 

 BP Diastolic  82 mmHg  

 

 Respiratory Rate  16 /min  

 

 Pain Level  4  

 

 BMI (Body Mass Index)  29.7 kg/m2  









 2018  Height  60 inches  5'0"









 Weight  150.00 lb  

 

 Heart Rate  81 /min  

 

 BP Systolic  185 mmHg  

 

 BP Diastolic  85 mmHg  

 

 Respiratory Rate  16 /min  

 

 BMI (Body Mass Index)  29.3 kg/m2  









 2018  Height  60 inches  5'0"









 Weight  152.00 lb  per pt

 

 Heart Rate  82 /min  reg

 

 BP Systolic Sitting  170 mmHg  Lue, reg cuff

 

 BP Diastolic Sitting  100 mmHg  Lue, reg cuff

 

 Respiratory Rate  16 /min  

 

 Pain Level  4  left knee

 

 BMI (Body Mass Index)  29.7 kg/m2  









 2017  Height  60 inches  5'0"









 Weight  152.00 lb  

 

 Heart Rate  62 /min  

 

 Respiratory Rate  15 /min  

 

 Body Temperature  97.6 F  

 

 Pain Level  2  

 

 BMI (Body Mass Index)  29.7 kg/m2  









 2017  Height  60 inches  5'0"









 Weight  152.00 lb  

 

 BP Systolic  140 mmHg  

 

 BP Diastolic  84 mmHg  

 

 Body Temperature  97.8 F  

 

 BMI (Body Mass Index)  29.7 kg/m2  









 2017  Height  60 inches  5'0"









 Weight  153.00 lb  

 

 Heart Rate  84 /min  

 

 BP Systolic  157 mmHg  

 

 BP Diastolic  82 mmHg  

 

 Body Temperature  98.3 F  

 

 BMI (Body Mass Index)  29.9 kg/m2  









 11/15/2017  Height  61.5 inches  5'1.50"









 Weight  152.00 lb  

 

 BP Systolic  158 mmHg  

 

 BP Diastolic  82 mmHg  

 

 Body Temperature  97.7 F  

 

 BMI (Body Mass Index)  28.3 kg/m2  









 2017  Height  61 inches  5'1"









 Weight  152.00 lb  

 

 Heart Rate  77 /min  

 

 BP Systolic  181 mmHg  

 

 BP Diastolic  92 mmHg  

 

 BMI (Body Mass Index)  28.7 kg/m2  









 10/16/2017  Height  61 inches  5'1"









 Weight  150.00 lb  

 

 Heart Rate  76 /min  

 

 BP Systolic  143 mmHg  

 

 BP Diastolic  92 mmHg  

 

 Body Temperature  97.8 F  

 

 Pain Level  8  

 

 BMI (Body Mass Index)  28.3 kg/m2  







Results







 Test  Date  Test  Result  H/L  Range  Note

 

 CBC No Diff  2017  White Blood Count  8.4 10^3/uL    3.5-10.8  









 Red Blood Count  4.67 10^6/uL    4.0-5.4  

 

 Hemoglobin  13.9 g/dL    12.0-16.0  

 

 Hematocrit  41 %    35-47  

 

 Mean Corpuscular Volume  89 fL    80-97  

 

 Mean Corpuscular Hemoglobin  30 pg    27-31  

 

 Mean Corpuscular HGB Conc  34 g/dL    31-36  

 

 Red Cell Distribution Width  15 %    10.5-15  

 

 Platelet Count  239 10^3/uL    150-450  

 

 Mean Platelet Volume  9 um3    7.4-10.4  









 Urinalysis Profile  2017  Urine Color  Yellow      









 Urine Appearance  Clear      

 

 Urine Specific Gravity  1.015    1.010-1.030  

 

 Urine pH  5.0    5-9  

 

 Urine Urobilinogen  Negative    Negative  

 

 Urine Ketones  Trace    Negative  

 

 Urine Protein  Negative    Negative  

 

 Urine Leukocytes  Negative    Negative  

 

 Urine Blood  Negative    Negative  

 

 Urine Nitrite  Negative    Negative  

 

 Urine Bilirubin  Negative    Negative  

 

 Urine Glucose  Negative    Negative  









 Type & Screen  2017  Patient Blood Type  A Positive      









 Antibody Screen  NEGATIVE      









 Comp Metabolic Panel  2017  Sodium  137 mmol/L    133-145  









 Potassium  3.9 mmol/L    3.5-5.0  

 

 Chloride  101 mmol/L    101-111  

 

 Co2 Carbon Dioxide  28 mmol/L    22-32  

 

 Anion Gap  8 mmol/L    2-11  

 

 Glucose  87 mg/dL      

 

 Blood Urea Nitrogen  15 mg/dL    6-24  

 

 Creatinine  0.76 mg/dL    0.51-0.95  

 

 BUN/Creatinine Ratio  19.7    8-20  

 

 Calcium  9.2 mg/dL    8.6-10.3  

 

 Total Protein  7.7 g/dL    6.4-8.9  

 

 Albumin  4.3 g/dL    3.2-5.2  

 

 Globulin  3.4 g/dL    2-4  

 

 Albumin/Globulin Ratio  1.3    1-3  

 

 Total Bilirubin  0.50 mg/dL    0.2-1.0  

 

 Alkaline Phosphatase  77 U/L      

 

 Alt  16 U/L    7-52  

 

 Ast  19 U/L    13-39  

 

 Egfr Non-  75.2    >60  

 

 Egfr   96.8    >60  1









 Laboratory test finding  2017  TSH (Thyroid Stim  2.72 mcIU/mL    0.34-
5.60  



     Horm)        

 

 Inr/Protime  2017  Inr  0.94    0.77-1.02  2

 

 Laboratory test finding  2017  Partial Thrombo  30.5 seconds    26.0-
36.3  



     Time PTT        

 

 Urine Culture And  2017  Urine Culture  SEE RESULT BELOW      3



 Sensitivities            









 1  *******Because ethnic data is not always readily available,



   this report includes an eGFR for both -Americans and



   non- Americans.****



   The National Kidney Disease Education Program (NKDEP) does



   not endorse the use of the MDRD equation for patients that



   are not between the ages of 18 and 70, are pregnant, have



   extremes of body size, muscle mass, or nutritional status,



   or are non- or non-.



   According to the National Kidney Foundation, irrespective of



   diagnosis, the stage of the disease is based on the level of



   kidney function:



   Stage Description                      GFR(mL/min/1.73 m(2))



   1     Kidney damage with normal or decreased GFR       90



   2     Kidney damage with mild decrease in GFR          60-89



   3     Moderate decrease in GFR                         30-59



   4     Severe decrease in GFR                           15-29



   5     Kidney failure                       <15 (or dialysis)

 

 2  Please note the change in INR reference range effective



   17.

 

 3  SEE RESULT BELOW



   -----------------------------------------------------------------------------
---------------



   Name:  MONSERRAT CONTEH                   : 1945    Attend Dr: Kathi Baldwin MD



   Acct:  [id number]  Unit: [id number]  AGE: 72            Location:  PAT



   Re17                        SEX: F             Status:    REG REF



   -----------------------------------------------------------------------------
---------------



   



   SPEC: [id number]         ALEX:   17-0    [facility] DR: Kathi Baldwin MD



   REQ:  [id number]              RECD:   



   STATUS: COMP



   _



   SOURCE: URINE          SPDESC:



   ORDERED:  Urine Culture



   QUERIES:  Urine Source: Clean Catch



   



   -----------------------------------------------------------------------------
---------------



   Procedure                         Result                         Reported   
        Site



   -----------------------------------------------------------------------------
---------------



   Urine Culture  Final                                             17-
0907      ML



   



   No growth of clinically significant organisms



   



   -----------------------------------------------------------------------------
---------------



   * ML - MAIN LAB ([facility])



   .



   



   



   



   



   



   



   



   



   



   



   



   



   



   



   



   



   



   



   



   



   



   



   



   



   



   ** END OF REPORT **



   



   * ML=Testing performed at Main Lab



   DEPARTMENT OF PATHOLOGY,  [address] [place] [address]



   Phone # [phone]      Fax #[phone]



   Israel Bonilla M.D. Director     [facility] # [id number]







Procedures







 Date  CPT Code  Description  Status  Comment

 

 12/15/2017  49328  TKR Total Knee Replacement  Completed  

 

 12/15/2017  75375  TKR Total Knee Replacement  Completed  

 

 2017  10687  EKG, Interpretation Only  Completed  

 

 2013  51506  Rad Exam; Wrist, Comp, Min 3 Views  Completed  LT







Encounters







 Type  Date  Location  Provider  CPT E/M  Dx

 

 Office Visit  2018  [facility]  Kathi Baldwin M.D.  [id number]  
M25.561



   10:30a  Of [facility]      









 M25.461

 

 M17.11









 Office Visit  2018 10:00a  [facility]  Kathi Baldwin M.D.  
[address]  M25.561



     C.MDeniseADenise      









 M25.461

 

 M17.11









 Office Visit  2018  9:15a  [facility]  Kathi Baldwin M.D.  
[address]3814  Z47.1



     [facility]      









 Z96.652

 

 M25.562

 

 S83.412A









 Office Visit  2018  9:45a  [facility]  Kathi Baldwin M.D.  
78833  M25.562



     C.M.ADenise      









 Z47.1

 

 Z96.652

 

 S83.412A









 Office Visit  11/15/2017 11:00a  [facility]  Kathi Baldwin M.D.  
[address]015  M25.562



     CDeniseMDeniseADenise      









 M25.462

 

 M17.12

 

 M25.561

 

 M25.461

 

 M17.11









 Office Visit  2017 11:30a  [facility]  Kathi Baldwin M.D.  
[address]458  M25.562



     [facility]      









 M25.462

 

 M17.12

 

 S83.242A









 Office Visit  10/16/2017 10:30a  [facility]  Kathi Baldwin M.D.  
[address]  M25.562



     [facility]      









 M25.462

 

 M79.605

 

 M17.12









 Office Visit  2013  9:00a  [facility]  [place],  
[address]  719.43



     Of [facility]IMO HERNANDEZ    







Plan of Care

Future Appointment(s):2018  9:15 am - Kathi Baldwin M.[facility]. [facility]2018  9:30 am - Kathi Baldwin M.D. [facility]10/24/2018 - Kathi Baldwin M.D.M25.561 Pain in right 
kneeFollow up:Follow up:   2 weeks after yxcgvowZ74.461 Effusion, right 
kneeM17.11 Unilateral primary osteoarthritis, right knee

## 2018-11-07 LAB
HCT VFR BLD AUTO: 35 % (ref 35–47)
HGB BLD-MCNC: 12.1 G/DL (ref 12–16)
INR PPP/BLD: 0.99 (ref 0.77–1.02)
PLATELET # BLD AUTO: 221 10^3/UL (ref 150–450)

## 2018-11-07 RX ADMIN — HYDROCODONE BITARTRATE AND ACETAMINOPHEN PRN TAB: 5; 325 TABLET ORAL at 02:41

## 2018-11-07 RX ADMIN — HYDROCODONE BITARTRATE AND ACETAMINOPHEN PRN TAB: 5; 325 TABLET ORAL at 08:25

## 2018-11-07 RX ADMIN — HYDROCODONE BITARTRATE AND ACETAMINOPHEN PRN TAB: 5; 325 TABLET ORAL at 12:19

## 2018-11-07 RX ADMIN — HYDROCODONE BITARTRATE AND ACETAMINOPHEN PRN TAB: 5; 325 TABLET ORAL at 21:21

## 2018-11-07 RX ADMIN — MAGNESIUM HYDROXIDE SCH: 400 SUSPENSION ORAL at 21:44

## 2018-11-07 RX ADMIN — MAGNESIUM OXIDE TAB 400 MG (241.3 MG ELEMENTAL MG) SCH MG: 400 (241.3 MG) TAB at 08:00

## 2018-11-07 RX ADMIN — ACETAMINOPHEN SCH: 325 TABLET ORAL at 21:44

## 2018-11-07 RX ADMIN — MORPHINE SULFATE PRN MG: 4 INJECTION INTRAVENOUS at 00:35

## 2018-11-07 RX ADMIN — DOCUSATE SODIUM SCH: 100 CAPSULE, LIQUID FILLED ORAL at 21:44

## 2018-11-07 RX ADMIN — HYDROCODONE BITARTRATE AND ACETAMINOPHEN PRN TAB: 5; 325 TABLET ORAL at 07:09

## 2018-11-07 RX ADMIN — DOCUSATE SODIUM SCH MG: 100 CAPSULE, LIQUID FILLED ORAL at 08:00

## 2018-11-07 RX ADMIN — MAGNESIUM OXIDE TAB 400 MG (241.3 MG ELEMENTAL MG) SCH: 400 (241.3 MG) TAB at 21:45

## 2018-11-07 RX ADMIN — OXYCODONE HYDROCHLORIDE PRN MG: 5 CAPSULE ORAL at 04:21

## 2018-11-07 RX ADMIN — LOSARTAN POTASSIUM SCH MG: 25 TABLET, FILM COATED ORAL at 08:00

## 2018-11-07 RX ADMIN — HYDROCODONE BITARTRATE AND ACETAMINOPHEN PRN TAB: 5; 325 TABLET ORAL at 17:21

## 2018-11-07 RX ADMIN — ACETAMINOPHEN SCH: 325 TABLET ORAL at 06:16

## 2018-11-07 RX ADMIN — MORPHINE SULFATE PRN MG: 4 INJECTION INTRAVENOUS at 07:57

## 2018-11-07 RX ADMIN — ENOXAPARIN SODIUM SCH MG: 30 INJECTION SUBCUTANEOUS at 17:21

## 2018-11-07 RX ADMIN — MAGNESIUM HYDROXIDE SCH ML: 400 SUSPENSION ORAL at 08:00

## 2018-11-07 RX ADMIN — MORPHINE SULFATE PRN MG: 4 INJECTION INTRAVENOUS at 02:55

## 2018-11-07 RX ADMIN — HYDROCODONE BITARTRATE AND ACETAMINOPHEN PRN TAB: 5; 325 TABLET ORAL at 21:26

## 2018-11-07 RX ADMIN — ACETAMINOPHEN SCH: 325 TABLET ORAL at 12:36

## 2018-11-07 RX ADMIN — MORPHINE SULFATE PRN MG: 4 INJECTION INTRAVENOUS at 05:08

## 2018-11-07 NOTE — PN
Progress Note





- Progress Note


Date of Service: 11/07/18


SOAP: 


Subjective:


[]Patient seen and examined at bedside. She had difficulty with pain overnight 

and request 2 tabs norco q 4 hr for pain control, which was ordered for her. 

She denies CP, SOB, dizziness, nausea.








Objective:


[]General: Well appearing, NAD 


RLE: Right knee dressing CDI with cryo unit in use. Thigh is soft, DF/PF intact

, DP2+, sensation intact distally, capillary refill less than two seconds 

distally,


Calves supple and nontender without erythema, edema or palpable cords 





Assessment:


[]POD 1 sp Right total knee arthroplasty








Plan:


[]WBAT


PT/OT


lovenox, coumadin 8 mg today





 Vital Signs











Temp  97.3 F   11/07/18 07:17


 


Pulse  69   11/07/18 07:17


 


Resp  18   11/07/18 10:13


 


BP  130/64   11/07/18 07:17


 


Pulse Ox  98   11/07/18 08:00








 Intake & Output











 11/06/18 11/07/18 11/07/18





 18:59 06:59 18:59


 


Intake Total 9887 051 6318


 


Output Total 550 1050 


 


Balance 700 -350 1591


 


Weight 158 lb  


 


Intake:   


 


  IV Fluids 1250  1231


 


    ABX - VANCOMYCIN   260


 


    LR 1250  971


 


  Oral  700 360


 


Output:   


 


  Mariano 400 1050 


 


  Estimated Blood Loss 150  


 


Other:   


 


  # Bowel Movements  0 








 Laboratory Last Values











Hgb  12.1 g/dl (12.0-16.0)   11/07/18  06:47    


 


Hct  35 % (35-47)   11/07/18  06:47    


 


Plt Count  221 10^3/ul (150-450)   11/07/18  06:47    


 


MPV  8.2 fL (7.4-10.4)   11/07/18  06:47    


 


INR (Anticoag Therapy)  0.99  (0.77-1.02)   11/07/18  06:47    


 


Sodium  137 mmol/L (135-145)   11/07/18  06:47    


 


Potassium  4.1 mmol/L (3.5-5.0)   11/07/18  06:47    


 


Chloride  103 mmol/L (101-111)   11/07/18  06:47    


 


Carbon Dioxide  29 mmol/L (22-32)   11/07/18  06:47    


 


Anion Gap  5 mmol/L (2-11)   11/07/18  06:47    


 


BUN  17 mg/dL (6-24)   11/07/18  06:47    


 


Creatinine  0.77 mg/dL (0.51-0.95)   11/07/18  06:47    


 


Est GFR ( Amer)  89.4  (>60)   11/07/18  06:47    


 


Est GFR (Non-Af Amer)  73.9  (>60)   11/07/18  06:47    


 


BUN/Creatinine Ratio  22.1  (8-20)  H  11/07/18  06:47    


 


Glucose  116 mg/dL ()  H  11/07/18  06:47    


 


Calcium  8.6 mg/dL (8.6-10.3)   11/07/18  06:47

## 2018-11-08 VITALS — DIASTOLIC BLOOD PRESSURE: 56 MMHG | SYSTOLIC BLOOD PRESSURE: 129 MMHG

## 2018-11-08 LAB
HCT VFR BLD AUTO: 33 % (ref 35–47)
HGB BLD-MCNC: 11.4 G/DL (ref 12–16)
INR PPP/BLD: 1.7 (ref 0.77–1.02)
PLATELET # BLD AUTO: 181 10^3/UL (ref 150–450)

## 2018-11-08 RX ADMIN — HYDROCODONE BITARTRATE AND ACETAMINOPHEN PRN TAB: 5; 325 TABLET ORAL at 01:29

## 2018-11-08 RX ADMIN — HYDROCODONE BITARTRATE AND ACETAMINOPHEN PRN TAB: 5; 325 TABLET ORAL at 05:40

## 2018-11-08 RX ADMIN — ACETAMINOPHEN SCH: 325 TABLET ORAL at 04:58

## 2018-11-08 RX ADMIN — DOCUSATE SODIUM SCH: 100 CAPSULE, LIQUID FILLED ORAL at 08:20

## 2018-11-08 RX ADMIN — ENOXAPARIN SODIUM SCH MG: 30 INJECTION SUBCUTANEOUS at 11:12

## 2018-11-08 RX ADMIN — MAGNESIUM HYDROXIDE SCH: 400 SUSPENSION ORAL at 08:20

## 2018-11-08 RX ADMIN — LOSARTAN POTASSIUM SCH MG: 25 TABLET, FILM COATED ORAL at 08:23

## 2018-11-08 RX ADMIN — HYDROCODONE BITARTRATE AND ACETAMINOPHEN PRN TAB: 5; 325 TABLET ORAL at 10:09

## 2018-11-08 RX ADMIN — MAGNESIUM OXIDE TAB 400 MG (241.3 MG ELEMENTAL MG) SCH MG: 400 (241.3 MG) TAB at 08:23

## 2018-11-08 NOTE — OP
DATE OF OPERATION:  11/06/18 - ROOM #346

 

DATE OF BIRTH:  [birth year]

 

SURGEON:  Kathi Baldwin MD.

 

ASSISTANT:  LLOYD Sweeney.  Ms. Milagrosjohnathan did help throughout the procedure 
with preparation of the leg, wound retraction, manipulation of the knee, and 
wound closure.

 

ANESTHESIOLOGIST:  Dr. Oneill

 

ANESTHESIA:  Spinal.

 

PRE-OP DIAGNOSIS:  Severe endstage degenerative osteoarthritis of the right 
knee joint.

 

POST-OP DIAGNOSIS:  Severe endstage degenerative osteoarthritis of the right 
knee joint.

 

OPERATIVE PROCEDURE:  Right total knee arthroplasty.

 

TOTAL TOURNIQUET TIME:  44 minutes.

 

COMPLICATIONS:  None.

 

ESTIMATED BLOOD LOSS:  200 cc.

 

SPECIMEN:  Bone and cartilage from the right knee joint sent to Pathology.

 

HARDWARE USED:  This is cemented Smith [employer] Nephew total knee arthroplasty 
hardware. For this, two packages of Simplex bone cement. For the femur, a size 
4 right posterior stabilized Legion femoral component. For the tibia, a size 3, 
Marley II right tibial base plate.  For the insert, a 9-mm posterior 
stabilized articular insert, size 3-4 and for the patella, a 32-mm 3 peg all 
poly patella with 7.5 thickness.

 

BRIEF HISTORY AND INDICATIONS:  Ms. Grant is a 73-year-old female with years 
of intermittent and increasingly severe right knee pain.  Her radiographs 
showed advanced arthritis.  She failed conservative treatment with 
antiinflammatories, pain medications, physical therapy and intraarticular 
injection,.  Due to continued pain and decreased quality of life, she elected 
to undergo right total knee arthroplasty.  Informed consent was obtained from 
the patient.  She understood the risks of surgery included, but were not 
limited to, bleeding, infection, damage to nearby structures, continued pain, 
need for further surgery, intraoperative fracture, nerve palsy, hardware 
failure or loosening, knee stiffness, loss of motion, stroke, heart attack, 
blood clot, and death.  She wished to proceed.

 

INTRAOPERATIVE FINDINGS:  Intraoperatively, the patient was noted to have 
severe endstage arthritis of the medial and patellofemoral compartments with 
complete loss of cartilage.

 

DESCRIPTION OF PROCEDURE:  Ms. Grant was identified in the preanesthesia unit. 
Her right lower extremity was marked as the correct operative side.  Informed 
consent was signed and placed in the chart.  The patient was taken to the 
operating room and placed under spinal anesthesia.  A Mariano catheter was 
placed.  Tourniquet was placed on the right thigh.  Right lower extremity was 
prepped and draped in the usual sterile fashion.  Preop time-out was made to 
correctly identify the patient's side and site.  Appropriate perioperative 
antibiotics were given within 1 hour of incision.

 

Tourniquet was inflated and total tourniquet time for this procedure was 44 
minutes.  A midline incision was made with a 10 blade and carried down to the 
extensor mechanism. A new 10 blade was used to make a standard medial 
parapatellar arthrotomy.  Patella was subluxed laterally.  Electrocautery was 
used to subperiosteally elevate the soft tissue off the superomedial tibia to 
the mid sagittal plane.  The knee was flexed up.  The anterior horn of the 
lateral meniscus and ACL were sharply released.  A drill was used to enter the 
distal femur. Intramedullary distal femoral cutting guide was pinned on the 
distal femur.  An oscillating saw was used to make the distal femoral cut.  The 
external rotation guide was pinned on the distal femur.  The distal femur was 
sized to a size 4.  A size 4 multi-cutting jig was pinned on the distal femur.  
Oscillating saw was used to make the appropriate 4 chamfer cuts.

 

The PCL was completely released and tibia was subluxed anteriorly.  
Extramedullary tibial cutting guide was pinned on the proximal tibia.  Proximal 
tibial cut was made with an oscillating saw perpendicular to the mechanical 
axis of the tibia. The bone was carefully removed.  The knee was brought into 
the full extension.  The spacer block had good fit with the knee in full 
extension.  Medial and lateral ligaments were well balanced.  Flexion extension 
gaps were well balanced.  The knee was flexed up.  Lamina  was placed 
both medially and laterally.  Any remaining meniscus was carefully removed 
using electrocautery.  Curved osteotome was used to remove any posterior 
osteophytes.  Tibial tray and drop favio were placed and confirmed a satisfactory 
tibial cut.

 

A size 4 right femoral trial was impacted on to the distal femur and had 
excellent fit.  The box for the posterior stabilized implant was prepared using 
a reamer and box cut osteotome.  A size 3 tibial tray trial with a 9-mm insert 
trial was placed and the knee was taken through a range of motion.  The knee 
had full extension to 130 degrees of flexion.  There was satisfactory 
patellofemoral tracking.  The patella was everted.  7 mm of patellar bone and 
cartilage was carefully removed using an oscillating saw.  The patella was 
sized to a size 32.  A 32 trial patella with a 7.5 thickness was chosen.  The 
knee was taken through a range of motion and there was satisfactory 
patellofemoral tracking.

 

All trials were carefully removed.  The tibia was subluxed anteriorly and sized 
to a size 3.  Proximal tibia was prepared using a size 3 keel punch.  All bony 
cut surfaces were copiously irrigated with sterile saline and dried.  Final 
implants were cemented into place starting with the tibia followed by the femur 
and lastly the patella.  A 9-mm insert trial was placed and the knee was 
brought into full extension.  The knee was copiously irrigated.  The tourniquet 
was turned down at 44 minutes.  Electrocautery was used to obtain meticulous 
hemostasis.  Once the cement had fully cured, the insert trial was removed.  
Any excess cement was removed from around the capsule and hardware.

 

Final insert chosen was a 9-mm posterior stabilized articular insert, size 3-4. 
This was locked into position on the tibial tray.  Stability of the insert was 
checked and rechecked and noted to be stable.

 

The extensor mechanism was closed using interrupted #1 Vicryls.  The rest of 
the incisions were closed in layered fashion using 0 and 2-0 Vicryls.  The skin 
was closed using running 3-0 nylon suture.  Sterile Xeroform, 4x4s, and Webril 
were used to cover the incision.  The patient's anesthesia was reversed without 
difficulty.  She was taken to the PACU in stable condition.  Intended 
weightbearing will be weightbearing as tolerated.  Intended DVT prophylaxis 
will be Coumadin with a Lovenox bridge.

 

 [id number] #: [id number]

LISA

## 2018-11-08 NOTE — PN
Progress Note





- Progress Note


Date of Service: 11/08/18


SOAP: 


Subjective:


[]Patient seen and examined at bedside. She feels well and desires DC to home. 

Denies CP, SOB, dizziness, nausea. Goals met with PT.








Objective:


[]General: Well appearing, NAD 


RLE: Right knee dressing changed by dr abbasi this morning, remains CDI with 

cryo unit in use. Thigh is soft, DF/PF intact, DP2+, sensation intact distally, 

capillary refill less than two seconds distally.


Calves supple and nontender without erythema, edema or palpable cords 





Assessment:


[]POD 2 sp Right total knee arthroplasty








Plan:


[]WBAT


PT/OT


Denies coumadin at home. Took aspirin after her last joint replacement and 

strongly desires to do so again. She denies history of DVT,PE, DM2, present or 

past cancer dx, hormone use. I will discharge her on  BID for 30 days. 

She has been educated on signs and symptoms of DVT/PE.


Resume losartan tomorrow 





 Vital Signs











Temp  97.9 F   11/08/18 07:31


 


Pulse  57   11/08/18 07:31


 


Resp  14   11/08/18 08:00


 


BP  130/66   11/08/18 07:31


 


Pulse Ox  97   11/08/18 08:00








 Intake & Output











 11/07/18 11/08/18 11/08/18





 18:59 06:59 18:59


 


Intake Total 1711 1800 360


 


Output Total 150 450 300


 


Balance 1561 1350 60


 


Intake:   


 


  IV Fluids 1231  


 


    ABX - VANCOMYCIN 260  


 


      


 


  Oral 480 1800 360


 


Output:   


 


  Urine 150 450 300


 


Other:   


 


  Estimated Void Medium Small 


 


  Date of Last Bowel  11/8/18 





  Movement   


 


  # Bowel Movements 3 1 


 


  Estimated Stool Amount Medium Medium 


 


  # Voids 3 1 








 Laboratory Last Values











Hgb  11.4 g/dl (12.0-16.0)  L  11/08/18  05:58    


 


Hct  33 % (35-47)  L  11/08/18  05:58    


 


Plt Count  181 10^3/ul (150-450)   11/08/18  05:58    


 


MPV  8.5 fL (7.4-10.4)   11/08/18  05:58    


 


INR (Anticoag Therapy)  1.70  (0.77-1.02)  H  11/08/18  05:58    


 


Sodium  137 mmol/L (135-145)   11/07/18  06:47    


 


Potassium  4.1 mmol/L (3.5-5.0)   11/07/18  06:47    


 


Chloride  103 mmol/L (101-111)   11/07/18  06:47    


 


Carbon Dioxide  29 mmol/L (22-32)   11/07/18  06:47    


 


Anion Gap  5 mmol/L (2-11)   11/07/18  06:47    


 


BUN  17 mg/dL (6-24)   11/07/18  06:47    


 


Creatinine  0.77 mg/dL (0.51-0.95)   11/07/18  06:47    


 


Est GFR ( Amer)  89.4  (>60)   11/07/18  06:47    


 


Est GFR (Non-Af Amer)  73.9  (>60)   11/07/18  06:47    


 


BUN/Creatinine Ratio  22.1  (8-20)  H  11/07/18  06:47    


 


Glucose  116 mg/dL ()  H  11/07/18  06:47    


 


Calcium  8.6 mg/dL (8.6-10.3)   11/07/18  06:47

## 2018-11-09 NOTE — DS
DISCHARGE SUMMARY:

 

DATE OF ADMISSION:  11/06/18

 

DATE OF DISCHARGE:  11/08/18

 

PROVIDER:  Dr. Kathi Baldwin.* (DICTATED BY LLOYD LIMA)

 

ASSISTANT:  LLOYD Huddleston

 

PREOPERATIVE DIAGNOSIS:  Severe end-stage degenerative osteoarthritis of the 
right knee joint.

 

OPERATIVE PROCEDURE:  Right total knee arthroplasty.

 

HISTORY:  Ms. Grant is a 73-year-old female with years of increasingly severe 
right knee pain.  She failed conservative management and elected to undergo a 
right total knee arthroplasty.

 

HOSPITAL COURSE:  The patient was admitted to [facility] on 11/06/
18. She underwent a right total knee arthroplasty without complication.  Postop 
day 1, she is well appearing, in no acute distress.  Dressing was clean, dry, 
and intact. Thigh was soft.  Dorsiflexion and plantarflexion intact.  DP pulse 2
+.  Sensation intact distally.  Postop day 2, she was again well appearing, in 
no acute distress. Dressing was changed by Dr. Baldwin this morning without 
complication.  Vital Signs: Temperature 97.9, pulse 57, respiratory rate 14, 
blood pressure 130/66, pulse ox 97.  Labs:  Hemoglobin 11.4, hematocrit 33.  
INR 1.70.  Sodium 137, potassium 4.1. Patient was seemed to be medically and 
orthopedically stable for discharge home. She did have consultation with our 
medicine team while she was here.  Her losartan was held during the immediate 
postoperative period, but was restarted on her normal dosage upon discharge.

 

DISCHARGE MEDICATIONS:

1.  Magnesium oxide 400 mg 2 tabs p.o. b.i.d.

2.  Calcium, magnesium, zinc tablet, 1 tab p.o. q.a.m.

3.  Milk thistle 300 mg p.o. q.a.m.

4.  CoQ10 30 mg p.o. q.a.m.

5.  Cholecalciferol 2000 mg p.o. q.a.m.

6.  Women's multivitamin 1 tab p.o. q.a.m.

7.  Losartan 25 mg p.o. q.a.m.

8.  AlgaeCal supplement 2 caps p.o. q.a.m.

9.  Selenium 400 mcg p.o. q.a.m.

10.  Magnesium 2 tabs p.o. b.i.d.

11.  Psyllium husk 0.4 g p.o. q.a.m. p.r.n.

12.  Zinc 50 mg p.o. q.a.m.

13.  Vitamin K2 100 mcg p.o. q.a.m.

14.  Kelp 100 mg q.a.m.

 

New medications for home:

1.  Aspirin 325 mg p.o. b.i.d. for 30 days.

2.  Docusate 100 mg p.o. b.i.d. p.r.n.

3.  Norco 5/325 one to two tabs every 4 to 6 hours as needed for pain, max 
daily dose of 10.

4.  Acetaminophen 975 mg p.o. q.8 hours p.r.n.

 

DISCHARGE PLAN:  The patient will be weightbearing as tolerated.  She will not 
need lab draws as she has refused Coumadin and she will now be on aspirin for 
30 days outpatient.  DVT prophylaxis is aspirin 325 every 12 hours for 30 days.
  Pain control with Norco 5/325, take 1 to 2 tabs by mouth every 4 to 6 hours 
as needed for pain, max of 10 tabs in a day.  Follow up with Dr. Baldwin in 10 to 
14 days. Medications sent to [facility].

 

DISCHARGE DISPOSITION:  To home.

 

____________________________________ LLOYD LIMA

 

[id number]/CPS #: [id number]

MTDD